# Patient Record
Sex: MALE | URBAN - METROPOLITAN AREA
[De-identification: names, ages, dates, MRNs, and addresses within clinical notes are randomized per-mention and may not be internally consistent; named-entity substitution may affect disease eponyms.]

---

## 2023-06-22 ENCOUNTER — APPOINTMENT (OUTPATIENT)
Dept: URBAN - METROPOLITAN AREA CLINIC 309 | Age: 43
Setting detail: DERMATOLOGY
End: 2023-06-22

## 2023-06-22 DIAGNOSIS — L738 OTHER SPECIFIED DISEASES OF HAIR AND HAIR FOLLICLES: ICD-10-CM

## 2023-06-22 DIAGNOSIS — L663 OTHER SPECIFIED DISEASES OF HAIR AND HAIR FOLLICLES: ICD-10-CM

## 2023-06-22 DIAGNOSIS — L72.0 EPIDERMAL CYST: ICD-10-CM

## 2023-06-22 DIAGNOSIS — Q819 OTHER SPECIFIED ANOMALIES OF SKIN: ICD-10-CM

## 2023-06-22 DIAGNOSIS — Q828 OTHER SPECIFIED ANOMALIES OF SKIN: ICD-10-CM

## 2023-06-22 DIAGNOSIS — Q826 OTHER SPECIFIED ANOMALIES OF SKIN: ICD-10-CM

## 2023-06-22 PROBLEM — L02.222 FURUNCLE OF BACK [ANY PART, EXCEPT BUTTOCK]: Status: ACTIVE | Noted: 2023-06-22

## 2023-06-22 PROBLEM — L02.02 FURUNCLE OF FACE: Status: ACTIVE | Noted: 2023-06-22

## 2023-06-22 PROBLEM — L02.32 FURUNCLE OF BUTTOCK: Status: ACTIVE | Noted: 2023-06-22

## 2023-06-22 PROBLEM — L85.8 OTHER SPECIFIED EPIDERMAL THICKENING: Status: ACTIVE | Noted: 2023-06-22

## 2023-06-22 PROCEDURE — OTHER REASSURANCE: OTHER

## 2023-06-22 PROCEDURE — OTHER PRESCRIPTION: OTHER

## 2023-06-22 PROCEDURE — OTHER DEFER: OTHER

## 2023-06-22 PROCEDURE — OTHER COUNSELING: OTHER

## 2023-06-22 PROCEDURE — OTHER MIPS QUALITY: OTHER

## 2023-06-22 PROCEDURE — 99204 OFFICE O/P NEW MOD 45 MIN: CPT

## 2023-06-22 PROCEDURE — OTHER TREATMENT REGIMEN: OTHER

## 2023-06-22 RX ORDER — HALOBETASOL PROPIONATE AND TAZAROTENE .1; .45 MG/G; MG/G
LOTION TOPICAL
Qty: 100 | Refills: 3 | Status: ERX | COMMUNITY
Start: 2023-06-22

## 2023-06-22 ASSESSMENT — LOCATION DETAILED DESCRIPTION DERM
LOCATION DETAILED: RIGHT PROXIMAL POSTERIOR UPPER ARM
LOCATION DETAILED: RIGHT INFERIOR UPPER BACK
LOCATION DETAILED: SUBMENTAL CHIN
LOCATION DETAILED: LEFT INFERIOR UPPER BACK
LOCATION DETAILED: LEFT INFERIOR CENTRAL MALAR CHEEK
LOCATION DETAILED: LEFT PROXIMAL POSTERIOR UPPER ARM
LOCATION DETAILED: RIGHT BUTTOCK
LOCATION DETAILED: LEFT BUTTOCK

## 2023-06-22 ASSESSMENT — BSA RASH: BSA RASH: 6

## 2023-06-22 ASSESSMENT — SEVERITY ASSESSMENT
SEVERITY: MILD
SEVERITY: MODERATE

## 2023-06-22 ASSESSMENT — LOCATION SIMPLE DESCRIPTION DERM
LOCATION SIMPLE: LEFT BUTTOCK
LOCATION SIMPLE: RIGHT BUTTOCK
LOCATION SIMPLE: RIGHT UPPER ARM
LOCATION SIMPLE: LEFT UPPER BACK
LOCATION SIMPLE: LEFT CHEEK
LOCATION SIMPLE: SUBMENTAL CHIN
LOCATION SIMPLE: LEFT UPPER ARM
LOCATION SIMPLE: RIGHT UPPER BACK

## 2023-06-22 ASSESSMENT — LOCATION ZONE DERM
LOCATION ZONE: ARM
LOCATION ZONE: FACE
LOCATION ZONE: TRUNK

## 2023-06-22 NOTE — HPI: SKIN LESION
What Type Of Note Output Would You Prefer (Optional)?: Standard Output
Is This A New Presentation, Or A Follow-Up?: Skin Lesion
Additional History: Pt presents today with a lump on his chin. Pt states onset was one month ago. Pt states this past Tuesday the lump popped and has clear drainage with blood

## 2023-06-22 NOTE — PROCEDURE: DEFER
Size Of Lesion In Cm (Optional): 1.5
Instructions (Optional): discussed r/b/a of excision and pt states he'll think about it as he's worried about developing a scar on the submental face
Other Procedure: defer excision of EIC
Procedure To Be Performed At Next Visit: Excision
X Size Of Lesion In Cm (Optional): 1
Detail Level: Detailed
Introduction Text (Please End With A Colon): The following procedure was deferred:

## 2023-06-22 NOTE — PROCEDURE: TREATMENT REGIMEN
Informed patient per Rylee PA he should take 10mg/10mg/5mg and have CBC rechecked in one week, patient verbalized understanding. Patient requested prescription for prednisone be changed from 5mg to 10mg, otherwise his future refills will be denied because it will be too soon to fill. Per Humaira FLORES OK to change prescription from 5mg to 10mg. Spoke with Sulema at patients pharmacy and notified prescription will need to be changed to 10mg one tab PO daily #90. Sulema verbalized understanding.  
Detail Level: Zone
Initiate Treatment: Duobrii 0.01%-0.045% topical cream qHS
Otc Regimen: Panoxyl 10% cream qHS

## 2023-07-13 ENCOUNTER — APPOINTMENT (RX ONLY)
Dept: URBAN - METROPOLITAN AREA CLINIC 35 | Facility: CLINIC | Age: 43
Setting detail: DERMATOLOGY
End: 2023-07-13

## 2023-07-13 DIAGNOSIS — L73.9 FOLLICULAR DISORDER, UNSPECIFIED: ICD-10-CM

## 2023-07-13 DIAGNOSIS — L72.8 OTHER FOLLICULAR CYSTS OF THE SKIN AND SUBCUTANEOUS TISSUE: ICD-10-CM

## 2023-07-13 DIAGNOSIS — Q828 OTHER SPECIFIED ANOMALIES OF SKIN: ICD-10-CM

## 2023-07-13 DIAGNOSIS — Q819 OTHER SPECIFIED ANOMALIES OF SKIN: ICD-10-CM

## 2023-07-13 DIAGNOSIS — L738 OTHER SPECIFIED DISEASES OF HAIR AND HAIR FOLLICLES: ICD-10-CM

## 2023-07-13 DIAGNOSIS — Q826 OTHER SPECIFIED ANOMALIES OF SKIN: ICD-10-CM

## 2023-07-13 DIAGNOSIS — L663 OTHER SPECIFIED DISEASES OF HAIR AND HAIR FOLLICLES: ICD-10-CM

## 2023-07-13 PROBLEM — L85.8 OTHER SPECIFIED EPIDERMAL THICKENING: Status: ACTIVE | Noted: 2023-07-13

## 2023-07-13 PROBLEM — L02.92 FURUNCLE, UNSPECIFIED: Status: ACTIVE | Noted: 2023-07-13

## 2023-07-13 PROCEDURE — ? DEFER

## 2023-07-13 PROCEDURE — ? PRESCRIPTION

## 2023-07-13 PROCEDURE — 99204 OFFICE O/P NEW MOD 45 MIN: CPT

## 2023-07-13 PROCEDURE — ? COUNSELING

## 2023-07-13 RX ORDER — CLINDAMYCIN PHOSPHATE 10 MG/ML
LOTION TOPICAL BID
Qty: 60 | Refills: 5 | Status: ERX | COMMUNITY
Start: 2023-07-13

## 2023-07-13 RX ADMIN — CLINDAMYCIN PHOSPHATE: 10 LOTION TOPICAL at 00:00

## 2023-07-13 NOTE — PROCEDURE: DEFER
Reason To Defer Override: pt opts to monitor
Size Of Lesion In Cm (Optional): 0
Detail Level: Detailed
Introduction Text (Please End With A Colon): The following procedure was deferred: